# Patient Record
Sex: FEMALE | Race: WHITE
[De-identification: names, ages, dates, MRNs, and addresses within clinical notes are randomized per-mention and may not be internally consistent; named-entity substitution may affect disease eponyms.]

---

## 2024-02-28 ENCOUNTER — APPOINTMENT (OUTPATIENT)
Dept: ORTHOPEDIC SURGERY | Facility: CLINIC | Age: 59
End: 2024-02-28

## 2024-02-28 ENCOUNTER — APPOINTMENT (OUTPATIENT)
Dept: ORTHOPEDIC SURGERY | Facility: CLINIC | Age: 59
End: 2024-02-28
Payer: COMMERCIAL

## 2024-02-28 VITALS — BODY MASS INDEX: 25.21 KG/M2 | HEIGHT: 62 IN | WEIGHT: 137 LBS

## 2024-02-28 DIAGNOSIS — M19.90 UNSPECIFIED OSTEOARTHRITIS, UNSPECIFIED SITE: ICD-10-CM

## 2024-02-28 DIAGNOSIS — M75.31 CALCIFIC TENDINITIS OF RIGHT SHOULDER: ICD-10-CM

## 2024-02-28 DIAGNOSIS — M75.41 IMPINGEMENT SYNDROME OF RIGHT SHOULDER: ICD-10-CM

## 2024-02-28 DIAGNOSIS — M75.51 BURSITIS OF RIGHT SHOULDER: ICD-10-CM

## 2024-02-28 DIAGNOSIS — Z85.3 PERSONAL HISTORY OF MALIGNANT NEOPLASM OF BREAST: ICD-10-CM

## 2024-02-28 PROBLEM — Z00.00 ENCOUNTER FOR PREVENTIVE HEALTH EXAMINATION: Status: ACTIVE | Noted: 2024-02-28

## 2024-02-28 PROCEDURE — 99204 OFFICE O/P NEW MOD 45 MIN: CPT

## 2024-02-28 PROCEDURE — 72040 X-RAY EXAM NECK SPINE 2-3 VW: CPT

## 2024-02-28 PROCEDURE — 73030 X-RAY EXAM OF SHOULDER: CPT | Mod: RT

## 2024-02-28 RX ORDER — TRAMADOL HYDROCHLORIDE 50 MG/1
50 TABLET, COATED ORAL
Qty: 20 | Refills: 0 | Status: ACTIVE | COMMUNITY
Start: 2024-02-28 | End: 1900-01-01

## 2024-02-28 RX ORDER — METHYLPREDNISOLONE 4 MG/1
4 TABLET ORAL
Qty: 1 | Refills: 0 | Status: ACTIVE | COMMUNITY
Start: 2024-02-28 | End: 1900-01-01

## 2024-02-28 NOTE — HISTORY OF PRESENT ILLNESS
[10] : 10 [Burning] : burning [Dull/Aching] : dull/aching [Radiating] : radiating [Shooting] : shooting [Throbbing] : throbbing [Constant] : constant [Full time] : Work status: full time [de-identified] :  02/28/2024: She is right hand dominant female with 3-4 day history of sig right shoudler pain and limited range of motion. states limited range of motion with above shoulder level activities, and activities of rotation such as dressing and reaching behind them. Pt has pain that wakes them up at night. They have tried otc nsaids with minimal relief.  known pmh of breast cancer and has had sig right sided surgery not able to have inj or bp on the right  [] : This patient has had an injection before: no [FreeTextEntry1] : left shoulder [FreeTextEntry5] : Patient complains of shoulder pain for the past 3-4 days, no specific injury pain, radiates down to the bicep. no prior hx

## 2024-02-28 NOTE — ASSESSMENT
[FreeTextEntry1] : discussed the role of ice and range of motion will start on medrol and tramadol for pain get into therapy  she will ask her oncologist of we may consider csi next visit if symptoms persist  f/u one week with Dr Alexandra smith

## 2024-02-28 NOTE — PHYSICAL EXAM
[Right] : right shoulder [Sitting] : sitting [Severe] : severe [4 ___] : forward flexion 4[unfilled]/5 [] : discomfort with strength testing [4___] : internal rotation 4[unfilled]/5 [FreeTextEntry9] : does not tolerate active range and only minimal passive

## 2024-03-07 ENCOUNTER — APPOINTMENT (OUTPATIENT)
Dept: ORTHOPEDIC SURGERY | Facility: CLINIC | Age: 59
End: 2024-03-07

## 2024-07-13 ENCOUNTER — APPOINTMENT (OUTPATIENT)
Dept: ORTHOPEDIC SURGERY | Facility: CLINIC | Age: 59
End: 2024-07-13
Payer: COMMERCIAL

## 2024-07-13 ENCOUNTER — TRANSCRIPTION ENCOUNTER (OUTPATIENT)
Age: 59
End: 2024-07-13

## 2024-07-13 VITALS — HEIGHT: 62 IN | BODY MASS INDEX: 25.21 KG/M2 | WEIGHT: 137 LBS

## 2024-07-13 DIAGNOSIS — M75.31 CALCIFIC TENDINITIS OF RIGHT SHOULDER: ICD-10-CM

## 2024-07-13 PROCEDURE — 99203 OFFICE O/P NEW LOW 30 MIN: CPT

## 2024-07-13 RX ORDER — METHYLPREDNISOLONE 4 MG/1
4 TABLET ORAL
Qty: 1 | Refills: 1 | Status: ACTIVE | COMMUNITY
Start: 2024-07-13 | End: 1900-01-01

## 2024-07-25 ENCOUNTER — APPOINTMENT (OUTPATIENT)
Dept: ORTHOPEDIC SURGERY | Facility: CLINIC | Age: 59
End: 2024-07-25

## 2024-07-25 DIAGNOSIS — M75.41 IMPINGEMENT SYNDROME OF RIGHT SHOULDER: ICD-10-CM

## 2024-07-25 DIAGNOSIS — M75.31 CALCIFIC TENDINITIS OF RIGHT SHOULDER: ICD-10-CM

## 2024-07-25 PROCEDURE — 99204 OFFICE O/P NEW MOD 45 MIN: CPT

## 2024-07-25 PROCEDURE — 73030 X-RAY EXAM OF SHOULDER: CPT | Mod: RT

## 2024-07-25 PROCEDURE — 73010 X-RAY EXAM OF SHOULDER BLADE: CPT | Mod: RT

## 2024-07-25 RX ORDER — TRAMADOL HYDROCHLORIDE 50 MG/1
50 TABLET, COATED ORAL
Qty: 40 | Refills: 0 | Status: ACTIVE | COMMUNITY
Start: 2024-07-25 | End: 1900-01-01

## 2024-07-25 NOTE — HISTORY OF PRESENT ILLNESS
[10] : 10 [8] : 8 [Burning] : burning [Radiating] : radiating [Throbbing] : throbbing [Tingling] : tingling [] : yes [Constant] : constant [Household chores] : household chores [Work] : work [Sleep] : sleep [Nothing helps with pain getting better] : Nothing helps with pain getting better [Full time] : Work status: full time [de-identified] : 07/25/2024: Patient reports about 5 months ago she was here for right shoulder pain they gave her a Medrol pack. Patient said that it gave her some relief however, the pain came back. She then came back to our office where a PA gave her Medrol Pack again and the patient states a PA informed her that it looks like she might have some tearing in the shoulder, and she does not know what to do since she is in so much pain. She had breast cancer and does not want to do a shot because her oncologist informed her that it can bring back the lymphedema. Patient states the pain is radiating down the arm and up her neck. She is experiencing numbness and tingling and throbbing pain. [FreeTextEntry6] : Numbness  [FreeTextEntry7] : Down the arm [de-identified] : / MA

## 2024-07-25 NOTE — ASSESSMENT
[FreeTextEntry1] : Right shoulder pain since February.  Was initially seen at the urgent care and large posterior laterally based calcific deposit was noted.  She was started on a steroid pack which helped.  The symptoms have since recurred she was given another another steroid pack but at this time no x-rays were obtained.  X-rays today demonstrate no calcific density.  At this point I am suspecting rotator cuff pathology.  Given failure conservative treatment with unremarkable x-rays the neck step is an MRI prior to deciding on next step in treatment.  We discussed a corticosteroid injection but she would have to discuss with her breast surgeon due to risk of lymphedema.  Follow-up to review after MRI.  Tramadol prescription for pain to be taken at night.   The patient's current medication management of their orthopedic diagnosis was reviewed today: (1) We discussed a comprehensive treatment plan that included pharmaceutical management involving the use of prescription medications. (2) There is a moderate risk of morbidity with further treatment, especially from use of prescription strength medications and possible side effects of these medications which include upset stomach with oral medications, skin reactions to topical medications and cardiac/renal/diabetes issues with long term use. (3) I recommended that the patient follow-up with their medical physician to discuss any significant specific potential issues with long term medication use such as interactions with current medications or with exacerbation of underlying medical comorbidities. (4) The benefits and risks associated with use of injectable, oral or topical, prescription and over the counter anti-inflammatory medications were discussed with the patient. The patient voiced understanding of the risks including but not limited to bleeding, stroke, kidney dysfunction, heart disease, and were referred to the black box warning label for further information.

## 2024-07-25 NOTE — HISTORY OF PRESENT ILLNESS
[10] : 10 [8] : 8 [Burning] : burning [Radiating] : radiating [Throbbing] : throbbing [Tingling] : tingling [] : yes [Constant] : constant [Household chores] : household chores [Work] : work [Sleep] : sleep [Nothing helps with pain getting better] : Nothing helps with pain getting better [Full time] : Work status: full time [de-identified] : 07/25/2024: Patient reports about 5 months ago she was here for right shoulder pain they gave her a Medrol pack. Patient said that it gave her some relief however, the pain came back. She then came back to our office where a PA gave her Medrol Pack again and the patient states a PA informed her that it looks like she might have some tearing in the shoulder, and she does not know what to do since she is in so much pain. She had breast cancer and does not want to do a shot because her oncologist informed her that it can bring back the lymphedema. Patient states the pain is radiating down the arm and up her neck. She is experiencing numbness and tingling and throbbing pain. [FreeTextEntry6] : Numbness  [FreeTextEntry7] : Down the arm [de-identified] : / MA

## 2024-07-25 NOTE — IMAGING
[de-identified] : Right Shoulder Exam Inspection: No swelling, no ecchymosis, no lucille deformity Palpation: Tenderness to palpation over greater tuberosity Stability: No instability or tenderness over AC joint Range of Motion: Active Forward Flexion 180 Passive FF: 180; ER: 90: IR: 70; ER at the side 50 Strength: 5/5 supraspinatus, infraspinatus and subscapularis; there is pain with strength testing Special testing: Positive Couch test, positive impingement sign; Speeds and yergason negative Neuro: Motor and sensory intact distally [Right] : right shoulder [There are no fractures, subluxations or dislocations. No significant abnormalities are seen] : There are no fractures, subluxations or dislocations. No significant abnormalities are seen

## 2024-07-25 NOTE — IMAGING
[de-identified] : Right Shoulder Exam Inspection: No swelling, no ecchymosis, no lucille deformity Palpation: Tenderness to palpation over greater tuberosity Stability: No instability or tenderness over AC joint Range of Motion: Active Forward Flexion 180 Passive FF: 180; ER: 90: IR: 70; ER at the side 50 Strength: 5/5 supraspinatus, infraspinatus and subscapularis; there is pain with strength testing Special testing: Positive Couch test, positive impingement sign; Speeds and yergason negative Neuro: Motor and sensory intact distally [Right] : right shoulder [There are no fractures, subluxations or dislocations. No significant abnormalities are seen] : There are no fractures, subluxations or dislocations. No significant abnormalities are seen

## 2024-07-26 RX ORDER — NAPROXEN 500 MG/1
500 TABLET ORAL
Qty: 10 | Refills: 0 | Status: ACTIVE | COMMUNITY
Start: 2024-07-26 | End: 1900-01-01

## 2024-07-29 ENCOUNTER — APPOINTMENT (OUTPATIENT)
Dept: MRI IMAGING | Facility: CLINIC | Age: 59
End: 2024-07-29

## 2024-08-08 ENCOUNTER — APPOINTMENT (OUTPATIENT)
Dept: ORTHOPEDIC SURGERY | Facility: CLINIC | Age: 59
End: 2024-08-08

## 2024-08-08 PROBLEM — M75.111 INCOMPLETE TEAR OF RIGHT ROTATOR CUFF, UNSPECIFIED WHETHER TRAUMATIC: Status: ACTIVE | Noted: 2024-08-08

## 2024-08-08 PROCEDURE — 99214 OFFICE O/P EST MOD 30 MIN: CPT | Mod: 25

## 2024-08-08 PROCEDURE — 20611 DRAIN/INJ JOINT/BURSA W/US: CPT | Mod: RT

## 2024-08-08 PROCEDURE — J3490M: CUSTOM

## 2024-08-08 NOTE — HISTORY OF PRESENT ILLNESS
[10] : 10 [8] : 8 [Burning] : burning [Radiating] : radiating [Throbbing] : throbbing [Tingling] : tingling [] : yes [Constant] : constant [Household chores] : household chores [Work] : work [Sleep] : sleep [Nothing helps with pain getting better] : Nothing helps with pain getting better [Full time] : Work status: full time